# Patient Record
Sex: FEMALE | Race: WHITE | NOT HISPANIC OR LATINO
[De-identification: names, ages, dates, MRNs, and addresses within clinical notes are randomized per-mention and may not be internally consistent; named-entity substitution may affect disease eponyms.]

---

## 2017-01-17 ENCOUNTER — TRANSCRIPTION ENCOUNTER (OUTPATIENT)
Age: 27
End: 2017-01-17

## 2017-04-21 ENCOUNTER — TRANSCRIPTION ENCOUNTER (OUTPATIENT)
Age: 27
End: 2017-04-21

## 2018-10-24 ENCOUNTER — TRANSCRIPTION ENCOUNTER (OUTPATIENT)
Age: 28
End: 2018-10-24

## 2019-05-26 ENCOUNTER — EMERGENCY (EMERGENCY)
Facility: HOSPITAL | Age: 29
LOS: 1 days | Discharge: HOME | End: 2019-05-26
Attending: EMERGENCY MEDICINE | Admitting: EMERGENCY MEDICINE
Payer: COMMERCIAL

## 2019-05-26 VITALS
HEART RATE: 105 BPM | RESPIRATION RATE: 18 BRPM | DIASTOLIC BLOOD PRESSURE: 78 MMHG | SYSTOLIC BLOOD PRESSURE: 117 MMHG | OXYGEN SATURATION: 97 % | TEMPERATURE: 98 F

## 2019-05-26 VITALS
RESPIRATION RATE: 18 BRPM | TEMPERATURE: 98 F | HEIGHT: 59 IN | WEIGHT: 138.01 LBS | SYSTOLIC BLOOD PRESSURE: 128 MMHG | DIASTOLIC BLOOD PRESSURE: 84 MMHG | HEART RATE: 110 BPM | OXYGEN SATURATION: 98 %

## 2019-05-26 DIAGNOSIS — R06.02 SHORTNESS OF BREATH: ICD-10-CM

## 2019-05-26 DIAGNOSIS — M79.605 PAIN IN LEFT LEG: ICD-10-CM

## 2019-05-26 DIAGNOSIS — F17.200 NICOTINE DEPENDENCE, UNSPECIFIED, UNCOMPLICATED: ICD-10-CM

## 2019-05-26 DIAGNOSIS — R11.2 NAUSEA WITH VOMITING, UNSPECIFIED: ICD-10-CM

## 2019-05-26 DIAGNOSIS — Z88.1 ALLERGY STATUS TO OTHER ANTIBIOTIC AGENTS STATUS: ICD-10-CM

## 2019-05-26 DIAGNOSIS — R00.0 TACHYCARDIA, UNSPECIFIED: ICD-10-CM

## 2019-05-26 DIAGNOSIS — M79.604 PAIN IN RIGHT LEG: ICD-10-CM

## 2019-05-26 DIAGNOSIS — R19.7 DIARRHEA, UNSPECIFIED: ICD-10-CM

## 2019-05-26 LAB — VALPROATE SERPL-MCNC: 52.2 UG/ML — SIGNIFICANT CHANGE UP (ref 50–100)

## 2019-05-26 PROCEDURE — 85610 PROTHROMBIN TIME: CPT

## 2019-05-26 PROCEDURE — 36415 COLL VENOUS BLD VENIPUNCTURE: CPT

## 2019-05-26 PROCEDURE — 93010 ELECTROCARDIOGRAM REPORT: CPT | Mod: NC

## 2019-05-26 PROCEDURE — 99284 EMERGENCY DEPT VISIT MOD MDM: CPT | Mod: 25

## 2019-05-26 PROCEDURE — 85379 FIBRIN DEGRADATION QUANT: CPT

## 2019-05-26 PROCEDURE — 81001 URINALYSIS AUTO W/SCOPE: CPT

## 2019-05-26 PROCEDURE — 96375 TX/PRO/DX INJ NEW DRUG ADDON: CPT

## 2019-05-26 PROCEDURE — 71045 X-RAY EXAM CHEST 1 VIEW: CPT | Mod: 26

## 2019-05-26 PROCEDURE — 71045 X-RAY EXAM CHEST 1 VIEW: CPT

## 2019-05-26 PROCEDURE — 82550 ASSAY OF CK (CPK): CPT

## 2019-05-26 PROCEDURE — 85025 COMPLETE CBC W/AUTO DIFF WBC: CPT

## 2019-05-26 PROCEDURE — 84484 ASSAY OF TROPONIN QUANT: CPT

## 2019-05-26 PROCEDURE — 82553 CREATINE MB FRACTION: CPT

## 2019-05-26 PROCEDURE — 80164 ASSAY DIPROPYLACETIC ACD TOT: CPT

## 2019-05-26 PROCEDURE — 96374 THER/PROPH/DIAG INJ IV PUSH: CPT

## 2019-05-26 PROCEDURE — 80053 COMPREHEN METABOLIC PANEL: CPT

## 2019-05-26 PROCEDURE — 96361 HYDRATE IV INFUSION ADD-ON: CPT

## 2019-05-26 PROCEDURE — 83690 ASSAY OF LIPASE: CPT

## 2019-05-26 PROCEDURE — 85730 THROMBOPLASTIN TIME PARTIAL: CPT

## 2019-05-26 PROCEDURE — 87086 URINE CULTURE/COLONY COUNT: CPT

## 2019-05-26 PROCEDURE — 83735 ASSAY OF MAGNESIUM: CPT

## 2019-05-26 PROCEDURE — 93005 ELECTROCARDIOGRAM TRACING: CPT

## 2019-05-26 RX ORDER — CLONAZEPAM 1 MG
1 TABLET ORAL ONCE
Refills: 0 | Status: DISCONTINUED | OUTPATIENT
Start: 2019-05-26 | End: 2019-05-26

## 2019-05-26 RX ORDER — OXYCODONE AND ACETAMINOPHEN 5; 325 MG/1; MG/1
1 TABLET ORAL ONCE
Refills: 0 | Status: DISCONTINUED | OUTPATIENT
Start: 2019-05-26 | End: 2019-05-26

## 2019-05-26 RX ORDER — KETOROLAC TROMETHAMINE 30 MG/ML
15 SYRINGE (ML) INJECTION ONCE
Refills: 0 | Status: DISCONTINUED | OUTPATIENT
Start: 2019-05-26 | End: 2019-05-26

## 2019-05-26 RX ORDER — ONDANSETRON 8 MG/1
4 TABLET, FILM COATED ORAL ONCE
Refills: 0 | Status: COMPLETED | OUTPATIENT
Start: 2019-05-26 | End: 2019-05-26

## 2019-05-26 RX ORDER — SODIUM CHLORIDE 9 MG/ML
1000 INJECTION INTRAMUSCULAR; INTRAVENOUS; SUBCUTANEOUS ONCE
Refills: 0 | Status: COMPLETED | OUTPATIENT
Start: 2019-05-26 | End: 2019-05-26

## 2019-05-26 RX ORDER — ONDANSETRON 8 MG/1
1 TABLET, FILM COATED ORAL
Qty: 12 | Refills: 0
Start: 2019-05-26

## 2019-05-26 RX ADMIN — SODIUM CHLORIDE 1000 MILLILITER(S): 9 INJECTION INTRAMUSCULAR; INTRAVENOUS; SUBCUTANEOUS at 07:45

## 2019-05-26 RX ADMIN — OXYCODONE AND ACETAMINOPHEN 1 TABLET(S): 5; 325 TABLET ORAL at 08:57

## 2019-05-26 RX ADMIN — Medication 15 MILLIGRAM(S): at 07:45

## 2019-05-26 RX ADMIN — Medication 1 MILLIGRAM(S): at 08:57

## 2019-05-26 RX ADMIN — SODIUM CHLORIDE 1000 MILLILITER(S): 9 INJECTION INTRAMUSCULAR; INTRAVENOUS; SUBCUTANEOUS at 08:57

## 2019-05-26 RX ADMIN — ONDANSETRON 4 MILLIGRAM(S): 8 TABLET, FILM COATED ORAL at 07:45

## 2019-05-26 NOTE — ED PROVIDER NOTE - CLINICAL SUMMARY MEDICAL DECISION MAKING FREE TEXT BOX
Impression: acute n/v/d, with associated leg pains. No abd pain/ tenderness. Afebrile, hds. Labs reviewed w/ findings of leukocytosis to 17. Electrolytes/ renal function wnl. D-dimer neg. VPA level within therapeutic range. CXR neg for i/e. EKG showing sinus tach, no arrhythmia/ ischemic changes. Do not suspect thromboembolism. Pt with likely viral gastroenteritis w/ associated myalgias. Pt feeling better with tx in ed. ED evaluation and management discussed with the patient and family in detail.  Close PMD follow up encouraged.  Strict ED return instructions discussed in detail and patient given the opportunity to ask any questions about their discharge diagnosis and instructions. Patient verbalized understanding.

## 2019-05-26 NOTE — ED ADULT NURSE NOTE - OBJECTIVE STATEMENT
Patient presents to the ED with c/o CP, nausea, vomiting, diarrhea, and BL leg pain beginning yesterday. States she was seen at [facility] MD for the same symptoms yesterday and was advised if symptoms persisted, to seek care at an ER. Patient speaking in complete sentences without difficulty. Denies acute cough, fever or abdominal pain. NAD noted work up in process, MD at bedside.

## 2019-05-26 NOTE — ED PROVIDER NOTE - PHYSICAL EXAMINATION
VITAL SIGNS: I have reviewed nursing notes and confirm.  CONSTITUTIONAL: Well-developed; well-nourished; in no acute distress.   SKIN:  warm and dry, no acute rash.   HEAD:  normocephalic, atraumatic.  EYES: EOM intact; conjunctiva and sclera clear.  ENT: No nasal discharge; airway clear.   NECK: Supple.  CARD: S1, S2 normal; no murmurs, gallops, or rubs. Tachycardic rate and reg rhythm.   RESP:  Clear to auscultation b/l, no wheezes, rales or rhonchi.  ABD: Normal bowel sounds; soft; non-distended; non-tender; no guarding/ rebound.  EXT: Normal ROM. No clubbing, cyanosis or edema. No calf tenderness/ cords. 2+ pulses to b/l ue/le.  NEURO: Alert, oriented, grossly unremarkable  PSYCH: Cooperative, mood and affect appropriate.

## 2019-05-26 NOTE — ED PROVIDER NOTE - NSFOLLOWUPINSTRUCTIONS_ED_ALL_ED_FT
Take zofran as needed for nausea/ vomiting. Drink plenty of fluids. Stick to BRAT diet (bananas, rice, applesauce toast). Follow up with your primary care physician for re-evaluation.     EnglishPortugueseRussianSpanish    Viral Gastroenteritis, Adult  Image   Viral gastroenteritis is also known as the stomach flu. This condition is caused by certain germs (viruses). These germs can be passed from person to person very easily (are very contagious). This condition can cause sudden watery poop (diarrhea), fever, and throwing up (vomiting).    Having watery poop and throwing up can make you feel weak and cause you to get dehydrated. Dehydration can make you tired and thirsty, make you have a dry mouth, and make it so you pee (urinate) less often. Older adults and people with other diseases or a weak defense system (immune system) are at higher risk for dehydration. It is important to replace the fluids that you lose from having watery poop and throwing up.    Follow these instructions at home:  Follow instructions from your doctor about how to care for yourself at home.    Eating and drinking     Follow these instructions as told by your doctor:  Take an oral rehydration solution (ORS). This is a drink that is sold at pharmacies and stores.  Drink clear fluids in small amounts as you are able, such as:  Water.  Ice chips.  Diluted fruit juice.  Low-calorie sports drinks.  Eat bland, easy-to-digest foods in small amounts as you are able, such as:  Bananas.  Applesauce.  Rice.  Low-fat (lean) meats.  Toast.  Crackers.  Avoid fluids that have a lot of sugar or caffeine in them.  Avoid alcohol.  Avoid spicy or fatty foods.  General instructions    ImageDrink enough fluid to keep your pee (urine) clear or pale yellow.  Wash your hands often. If you cannot use soap and water, use hand .  Make sure that all people in your home wash their hands well and often.  Rest at home while you get better.  Take over-the-counter and prescription medicines only as told by your doctor.  Watch your condition for any changes.  Take a warm bath to help with any burning or pain from having watery poop.  Keep all follow-up visits as told by your doctor. This is important.  Contact a doctor if:  You cannot keep fluids down.  Your symptoms get worse.  You have new symptoms.  You feel light-headed or dizzy.  You have muscle cramps.  Get help right away if:  You have chest pain.  You feel very weak or you pass out (faint).  You see blood in your throw-up.  Your throw-up looks like coffee grounds.  You have bloody or black poop (stools) or poop that look like tar.  You have a very bad headache, a stiff neck, or both.  You have a rash.  You have very bad pain, cramping, or bloating in your belly (abdomen).  You have trouble breathing.  You are breathing very quickly.  Your heart is beating very quickly.  Your skin feels cold and clammy.  You feel confused.  You have pain when you pee.  You have signs of dehydration, such as:  Dark pee, hardly any pee, or no pee.  Cracked lips.  Dry mouth.  Sunken eyes.  Sleepiness.  Weakness.  This information is not intended to replace advice given to you by your health care provider. Make sure you discuss any questions you have with your health care provider.    Document Released: 06/05/2009 Document Revised: 02/16/2018 Document Reviewed: 08/23/2016  [employer] Interactive Patient Education © 2019 [employer]

## 2019-05-26 NOTE — ED ADULT TRIAGE NOTE - CHIEF COMPLAINT QUOTE
shortness of breath / weakness /chest pain on/ off few days.Pt was seen at  [facility] yesterday for same problem,. pt woke up this morning with  vomiting and diarrhea.

## 2019-05-26 NOTE — ED ADULT NURSE REASSESSMENT NOTE - EENT WDL
Eyes with no visual disturbances. . Nose with pink mucosa and no drainage.  Mouth mucous membranes moist and pink.  No tenderness or swelling to throat or neck.

## 2019-05-26 NOTE — ED ADULT NURSE REASSESSMENT NOTE - NS ED NURSE REASSESS COMMENT FT1
Pt alert and oriented X3. valproic acid drawn and sent to lab. Pt still complaining of lower extremities pain in spite Toradol administration. Pt also complaining of "mind racinf" and feeling very "anxious. MD Caballero made aware. Pt was able to eat, tolerated well.

## 2019-05-26 NOTE — ED PROVIDER NOTE - OBJECTIVE STATEMENT
Pt is a 27yo f, h/o sinus tachycardia (baseline hr > 100bpm) thought to be 2/2 anxiety and bpd, currently on depakote, abilify, and klonopin, who p/w c/o n/v/d starting this am, nbnb emesis. + soft stools x 2, no melena/ blood. No abd pain, fever, chills, cough, congestion. No dysuria, freq, urgency, hematuria, flank pain. Also with c/o mild sob, and burning to b/l legs since this am, worse w/ walking. No chest pain, palpitations, syncope/ lightheadedness. Denies any recent trauma/ increased walking/ new exercises. No leg edema, prolonged immobility.

## 2019-05-26 NOTE — ED ADULT NURSE NOTE - NSIMPLEMENTINTERV_GEN_ALL_ED
Implemented All Universal Safety Interventions:  [place] to call system. Call bell, personal items and telephone within reach. Instruct patient to call for assistance. Room bathroom lighting operational. Non-slip footwear when patient is off stretcher. Physically safe environment: no spills, clutter or unnecessary equipment. Stretcher in lowest position, wheels locked, appropriate side rails in place.

## 2019-05-26 NOTE — ED PROVIDER NOTE - PROGRESS NOTE DETAILS
UA + for mod le and 5-10 wbcs. Pt is asymptomatic with no dysuria, freq, urgency of urination. Will send ucx. Pt advised that if ucx + for infection, will prescribe abx.

## 2019-12-17 ENCOUNTER — EMERGENCY (EMERGENCY)
Facility: HOSPITAL | Age: 29
LOS: 1 days | Discharge: HOME | End: 2019-12-17
Attending: EMERGENCY MEDICINE | Admitting: EMERGENCY MEDICINE
Payer: COMMERCIAL

## 2019-12-17 VITALS
WEIGHT: 160.06 LBS | DIASTOLIC BLOOD PRESSURE: 82 MMHG | HEIGHT: 59 IN | SYSTOLIC BLOOD PRESSURE: 137 MMHG | TEMPERATURE: 98 F | RESPIRATION RATE: 16 BRPM | OXYGEN SATURATION: 97 % | HEART RATE: 74 BPM

## 2019-12-17 VITALS
SYSTOLIC BLOOD PRESSURE: 114 MMHG | DIASTOLIC BLOOD PRESSURE: 67 MMHG | TEMPERATURE: 98 F | HEART RATE: 65 BPM | RESPIRATION RATE: 16 BRPM | OXYGEN SATURATION: 99 %

## 2019-12-17 LAB
ALBUMIN SERPL ELPH-MCNC: 4 G/DL — SIGNIFICANT CHANGE UP (ref 3.3–5)
ALP SERPL-CCNC: 113 U/L — SIGNIFICANT CHANGE UP (ref 40–120)
ALT FLD-CCNC: 33 U/L — SIGNIFICANT CHANGE UP (ref 10–45)
ANION GAP SERPL CALC-SCNC: 11 MMOL/L — SIGNIFICANT CHANGE UP (ref 5–17)
APPEARANCE UR: CLEAR — SIGNIFICANT CHANGE UP
AST SERPL-CCNC: 17 U/L — SIGNIFICANT CHANGE UP (ref 10–40)
BACTERIA # UR AUTO: PRESENT /HPF
BASOPHILS # BLD AUTO: 0.04 K/UL — SIGNIFICANT CHANGE UP (ref 0–0.2)
BASOPHILS NFR BLD AUTO: 0.5 % — SIGNIFICANT CHANGE UP (ref 0–2)
BILIRUB SERPL-MCNC: 0.2 MG/DL — SIGNIFICANT CHANGE UP (ref 0.2–1.2)
BILIRUB UR-MCNC: NEGATIVE — SIGNIFICANT CHANGE UP
BUN SERPL-MCNC: 9 MG/DL — SIGNIFICANT CHANGE UP (ref 7–23)
CALCIUM SERPL-MCNC: 8.8 MG/DL — SIGNIFICANT CHANGE UP (ref 8.4–10.5)
CHLORIDE SERPL-SCNC: 105 MMOL/L — SIGNIFICANT CHANGE UP (ref 96–108)
CO2 SERPL-SCNC: 23 MMOL/L — SIGNIFICANT CHANGE UP (ref 22–31)
COLOR SPEC: YELLOW — SIGNIFICANT CHANGE UP
COMMENT - URINE: SIGNIFICANT CHANGE UP
CREAT SERPL-MCNC: 0.63 MG/DL — SIGNIFICANT CHANGE UP (ref 0.5–1.3)
DIFF PNL FLD: ABNORMAL
EOSINOPHIL # BLD AUTO: 0.13 K/UL — SIGNIFICANT CHANGE UP (ref 0–0.5)
EOSINOPHIL NFR BLD AUTO: 1.7 % — SIGNIFICANT CHANGE UP (ref 0–6)
EPI CELLS # UR: ABNORMAL /HPF (ref 0–5)
GLUCOSE SERPL-MCNC: 250 MG/DL — HIGH (ref 70–99)
GLUCOSE UR QL: >=1000
HCG UR QL: NEGATIVE — SIGNIFICANT CHANGE UP
HCT VFR BLD CALC: 39.6 % — SIGNIFICANT CHANGE UP (ref 34.5–45)
HGB BLD-MCNC: 13.2 G/DL — SIGNIFICANT CHANGE UP (ref 11.5–15.5)
IMM GRANULOCYTES NFR BLD AUTO: 0.8 % — SIGNIFICANT CHANGE UP (ref 0–1.5)
KETONES UR-MCNC: ABNORMAL MG/DL
LEUKOCYTE ESTERASE UR-ACNC: NEGATIVE — SIGNIFICANT CHANGE UP
LIDOCAIN IGE QN: 39 U/L — SIGNIFICANT CHANGE UP (ref 7–60)
LYMPHOCYTES # BLD AUTO: 2.66 K/UL — SIGNIFICANT CHANGE UP (ref 1–3.3)
LYMPHOCYTES # BLD AUTO: 34.2 % — SIGNIFICANT CHANGE UP (ref 13–44)
MCHC RBC-ENTMCNC: 31.3 PG — SIGNIFICANT CHANGE UP (ref 27–34)
MCHC RBC-ENTMCNC: 33.3 GM/DL — SIGNIFICANT CHANGE UP (ref 32–36)
MCV RBC AUTO: 93.8 FL — SIGNIFICANT CHANGE UP (ref 80–100)
MONOCYTES # BLD AUTO: 0.49 K/UL — SIGNIFICANT CHANGE UP (ref 0–0.9)
MONOCYTES NFR BLD AUTO: 6.3 % — SIGNIFICANT CHANGE UP (ref 2–14)
NEUTROPHILS # BLD AUTO: 4.4 K/UL — SIGNIFICANT CHANGE UP (ref 1.8–7.4)
NEUTROPHILS NFR BLD AUTO: 56.5 % — SIGNIFICANT CHANGE UP (ref 43–77)
NITRITE UR-MCNC: NEGATIVE — SIGNIFICANT CHANGE UP
NRBC # BLD: 0 /100 WBCS — SIGNIFICANT CHANGE UP (ref 0–0)
PH UR: 7 — SIGNIFICANT CHANGE UP (ref 5–8)
PLATELET # BLD AUTO: 286 K/UL — SIGNIFICANT CHANGE UP (ref 150–400)
POTASSIUM SERPL-MCNC: 4 MMOL/L — SIGNIFICANT CHANGE UP (ref 3.5–5.3)
POTASSIUM SERPL-SCNC: 4 MMOL/L — SIGNIFICANT CHANGE UP (ref 3.5–5.3)
PROT SERPL-MCNC: 6.9 G/DL — SIGNIFICANT CHANGE UP (ref 6–8.3)
PROT UR-MCNC: NEGATIVE MG/DL — SIGNIFICANT CHANGE UP
RBC # BLD: 4.22 M/UL — SIGNIFICANT CHANGE UP (ref 3.8–5.2)
RBC # FLD: 11.3 % — SIGNIFICANT CHANGE UP (ref 10.3–14.5)
RBC CASTS # UR COMP ASSIST: < 5 /HPF — SIGNIFICANT CHANGE UP
SODIUM SERPL-SCNC: 139 MMOL/L — SIGNIFICANT CHANGE UP (ref 135–145)
SP GR SPEC: 1.01 — SIGNIFICANT CHANGE UP (ref 1–1.03)
UROBILINOGEN FLD QL: 0.2 E.U./DL — SIGNIFICANT CHANGE UP
WBC # BLD: 7.78 K/UL — SIGNIFICANT CHANGE UP (ref 3.8–10.5)
WBC # FLD AUTO: 7.78 K/UL — SIGNIFICANT CHANGE UP (ref 3.8–10.5)
WBC UR QL: < 5 /HPF — SIGNIFICANT CHANGE UP

## 2019-12-17 PROCEDURE — 80053 COMPREHEN METABOLIC PANEL: CPT

## 2019-12-17 PROCEDURE — 96374 THER/PROPH/DIAG INJ IV PUSH: CPT

## 2019-12-17 PROCEDURE — 96375 TX/PRO/DX INJ NEW DRUG ADDON: CPT

## 2019-12-17 PROCEDURE — 81025 URINE PREGNANCY TEST: CPT

## 2019-12-17 PROCEDURE — 99284 EMERGENCY DEPT VISIT MOD MDM: CPT | Mod: 25

## 2019-12-17 PROCEDURE — 36415 COLL VENOUS BLD VENIPUNCTURE: CPT

## 2019-12-17 PROCEDURE — 70450 CT HEAD/BRAIN W/O DYE: CPT

## 2019-12-17 PROCEDURE — 70450 CT HEAD/BRAIN W/O DYE: CPT | Mod: 26

## 2019-12-17 PROCEDURE — 85025 COMPLETE CBC W/AUTO DIFF WBC: CPT

## 2019-12-17 PROCEDURE — 83690 ASSAY OF LIPASE: CPT

## 2019-12-17 PROCEDURE — 99284 EMERGENCY DEPT VISIT MOD MDM: CPT

## 2019-12-17 PROCEDURE — 81001 URINALYSIS AUTO W/SCOPE: CPT

## 2019-12-17 RX ORDER — FAMOTIDINE 10 MG/ML
20 INJECTION INTRAVENOUS ONCE
Refills: 0 | Status: COMPLETED | OUTPATIENT
Start: 2019-12-17 | End: 2019-12-17

## 2019-12-17 RX ORDER — DIVALPROEX SODIUM 500 MG/1
0 TABLET, DELAYED RELEASE ORAL
Qty: 0 | Refills: 0 | DISCHARGE

## 2019-12-17 RX ORDER — ONDANSETRON 8 MG/1
4 TABLET, FILM COATED ORAL ONCE
Refills: 0 | Status: COMPLETED | OUTPATIENT
Start: 2019-12-17 | End: 2019-12-17

## 2019-12-17 RX ORDER — CLONAZEPAM 1 MG
0 TABLET ORAL
Qty: 0 | Refills: 0 | DISCHARGE

## 2019-12-17 RX ORDER — SODIUM CHLORIDE 9 MG/ML
1000 INJECTION INTRAMUSCULAR; INTRAVENOUS; SUBCUTANEOUS ONCE
Refills: 0 | Status: COMPLETED | OUTPATIENT
Start: 2019-12-17 | End: 2019-12-17

## 2019-12-17 RX ORDER — ONDANSETRON 8 MG/1
1 TABLET, FILM COATED ORAL
Qty: 10 | Refills: 0
Start: 2019-12-17

## 2019-12-17 RX ORDER — ARIPIPRAZOLE 15 MG/1
0 TABLET ORAL
Qty: 0 | Refills: 0 | DISCHARGE

## 2019-12-17 RX ADMIN — SODIUM CHLORIDE 1000 MILLILITER(S): 9 INJECTION INTRAMUSCULAR; INTRAVENOUS; SUBCUTANEOUS at 08:54

## 2019-12-17 RX ADMIN — ONDANSETRON 4 MILLIGRAM(S): 8 TABLET, FILM COATED ORAL at 08:56

## 2019-12-17 RX ADMIN — FAMOTIDINE 20 MILLIGRAM(S): 10 INJECTION INTRAVENOUS at 09:25

## 2019-12-17 NOTE — ED ADULT NURSE NOTE - OBJECTIVE STATEMENT
30 yo F presents to ED c/o vomiting and hiccups. Pt states, "the past week anabelle bene feeling unsteady and I have the hiccups sometimes and the past 2 days I started vomiting and have heartburn and feel bloated." Pt reports going to urgent care last night but came to ED as directed for persistent vomiting, reports 2 episodes of emesis last night. Able to keep down water, pasta, and bread. Upon assessment pt abdomen is distended and RUQ/ LUQ tender to palpation, abdomen hard, bowel sounds present, able to pass gas, last BM this morning. Pt denies any SOB, chest pain, diarrhea. blood in stool, hematuria, burning upon urination, blood in emesis, fever, chills. NAD, VSS, labs collected, will continue to assess.

## 2019-12-17 NOTE — ED PROVIDER NOTE - PATIENT PORTAL LINK FT
You can access the FollowMyHealth Patient Portal offered by [facility] by registering at the following website: [email or web address]. By joining [employer]’s FollowMyHealth portal, you will also be able to view your health information using other applications (apps) compatible with our system.

## 2019-12-17 NOTE — ED PROVIDER NOTE - ATTENDING CONTRIBUTION TO CARE
30 yo f presents to ED with concern for feeling off balance x 1 week.  Denies lightheadedness or dizziness.  Notes associated nausea and emesis that began two days ago.  At rest she feels well, and is only experiencing symptoms with ambulation.  On my face to face ED eval, patient is non toxic in appearance.  HRRR, lungs clear.  Abd soft, NT/ND.  No focal neuro deficits.  Will check labs, CT head and dispo accordingly.

## 2019-12-17 NOTE — ED PROVIDER NOTE - NSFOLLOWUPINSTRUCTIONS_ED_ALL_ED_FT
You were found to have elevated blood sugar and blood in your urine.  There is a change you are pre diabetic or diabetic.  Please call to make appointment with primary doctor within 3-5 days and drink plenty of water/diet changes as discussed.  Return to ED for fever, dizziness, fainting, abdominal pain, vomiting, difficulty breathing, change to mental status.    Preventing Type 2 Diabetes Mellitus  Type 2 diabetes (type 2 diabetes mellitus) is a long-term (chronic) disease that affects blood sugar (glucose) levels. Normally, a hormone called insulin allows glucose to enter cells in the body. The cells use glucose for energy. In type 2 diabetes, one or both of these problems may be present:  The body does not make enough insulin.The body does not respond properly to insulin that it makes (insulin resistance).Insulin resistance or lack of insulin causes excess glucose to build up in the blood instead of going into cells. As a result, high blood glucose (hyperglycemia) develops, which can cause many complications. Being overweight or obese and having an inactive (sedentary) lifestyle can increase your risk for diabetes. Type 2 diabetes can be delayed or prevented by making certain nutrition and lifestyle changes.  What nutrition changes can be made?     Eat healthy meals and snacks regularly. Keep a healthy snack with you for when you get hungry between meals, such as fruit or a handful of nuts.Eat lean meats and proteins that are low in saturated fats, such as chicken, fish, egg whites, and beans. Avoid processed meats.Eat plenty of fruits and vegetables and plenty of grains that have not been processed (whole grains). It is recommended that you eat:  1½?2 cups of fruit every day.2½?3 cups of vegetables every day.6?8 oz of whole grains every day, such as oats, whole wheat, bulgur, brown rice, quinoa, and millet.Eat low-fat dairy products, such as milk, yogurt, and cheese.Eat foods that contain healthy fats, such as nuts, avocado, olive oil, and canola oil.Drink water throughout the day. Avoid drinks that contain added sugar, such as soda or sweet tea.Follow instructions from your health care provider about specific eating or drinking restrictions.Control how much food you eat at a time (portion size).  Check food labels to find out the serving sizes of foods.Use a kitchen scale to weigh amounts of foods.Saute or steam food instead of frying it. Cook with water or broth instead of oils or butter.Limit your intake of:  Salt (sodium). Have no more than 1 tsp (2,400 mg) of sodium a day. If you have heart disease or high blood pressure, have less than ½?¾ tsp (1,500 mg) of sodium a day.Saturated fat. This is fat that is solid at room temperature, such as butter or fat on meat.What lifestyle changes can be made?  Activity        Do moderate-intensity physical activity for at least 30 minutes on at least 5 days of the week, or as much as told by your health care provider.Ask your health care provider what activities are safe for you. A mix of physical activities may be best, such as walking, swimming, cycling, and strength training.Try to add physical activity into your day. For example:  Park in spots that are farther away than usual, so that you walk more. For example, park in a far corner of the parking lot when you go to the office or the grocery store.Take a walk during your lunch break.Use stairs instead of elevators or escalators.Weight Loss     Lose weight as directed. Your health care provider can determine how much weight loss is best for you and can help you lose weight safely.If you are overweight or obese, you may be instructed to lose at least 5?7 % of your body weight.Alcohol and Tobacco        Limit alcohol intake to no more than 1 drink a day for nonpregnant women and 2 drinks a day for men. One drink equals 12 oz of beer, 5 oz of wine, or 1½ oz of hard liquor.Do not use any tobacco products, such as cigarettes, chewing tobacco, and e-cigarettes. If you need help quitting, ask your health care provider.Work With Your Health Care Provider     Have your blood glucose tested regularly, as told by your health care provider.Discuss your risk factors and how you can reduce your risk for diabetes.Get screening tests as told by your health care provider. You may have screening tests regularly, especially if you have certain risk factors for type 2 diabetes.Make an appointment with a diet and nutrition specialist (registered dietitian). A registered dietitian can help you make a healthy eating plan and can help you understand portion sizes and food labels.Why are these changes important?  It is possible to prevent or delay type 2 diabetes and related health problems by making lifestyle and nutrition changes.It can be difficult to recognize signs of type 2 diabetes. The best way to avoid possible damage to your body is to take actions to prevent the disease before you develop symptoms.What can happen if changes are not made?  Your blood glucose levels may keep increasing. Having high blood glucose for a long time is dangerous. Too much glucose in your blood can damage your blood vessels, heart, kidneys, nerves, and eyes.You may develop prediabetes or type 2 diabetes. Type 2 diabetes can lead to many chronic health problems and complications, such as:  Heart disease.Stroke.Blindness.Kidney disease.Depression.Poor circulation in the feet and legs, which could lead to surgical removal (amputation) in severe cases.Where to find support  Ask your health care provider to recommend a registered dietitian, diabetes educator, or weight loss program.Look for local or online weight loss groups.Join a gym, fitness club, or outdoor activity group, such as a walking club.Where to find more information  To learn more about diabetes and diabetes prevention, visit:  American Diabetes Association (ADA): www.diabetes.orgNational [place] of Diabetes and Digestive and Kidney Diseases: www.niddk.nih.gov/health-information/diabetesTo learn more about healthy eating, visit:  The U.S. Department of Agriculture (USDA), Choose My Plate: www.Syntasiamyplate.gov/food-groupsOffice of Disease Prevention and Health Promotion (ODPHP), Dietary Guidelines: www.health.gov/dietaryguidelinesSummary  You can reduce your risk for type 2 diabetes by increasing your physical activity, eating healthy foods, and losing weight as directed.Talk with your health care provider about your risk for type 2 diabetes. Ask about any blood tests or screening tests that you need to have.This information is not intended to replace advice given to you by your health care provider. Make sure you discuss any questions you have with your health care provider.

## 2019-12-17 NOTE — ED ADULT NURSE NOTE - NSIMPLEMENTINTERV_GEN_ALL_ED
Implemented All Universal Safety Interventions:  [place] to call system. Call bell, personal items and telephone within reach. Instruct patient to call for assistance. Room bathroom lighting operational. Non-slip footwear when patient is off stretcher. Physically safe environment: no spills, clutter or unnecessary equipment. Stretcher in lowest position, wheels locked, appropriate side rails in place.

## 2019-12-17 NOTE — ED PROVIDER NOTE - OBJECTIVE STATEMENT
30 y/o F with PMHx of Psychiatric problem p/w 1 week of feeling off balance and 2d vomiting and abd pain. States a week ago she was unable to walk in a straight line w/ occasional ringing in the ears. Denies numbness, weakness, vision changes, or head trauma. 2d ago pt started having nausea and vomiting. Today, pt has epigastric burning and feeling of acid coming up. Denies similar sxs in the past. Does not endorse fever, chills, HA, or neck pain.

## 2019-12-17 NOTE — ED PROVIDER NOTE - CHPI ED SYMPTOMS NEG
. Does not endorse fever, chills, HA,  neck pain, numbness, weakness, vision changes, or head trauma.

## 2019-12-17 NOTE — ED ADULT TRIAGE NOTE - CHIEF COMPLAINT QUOTE
pt c/o vomiting and abdominal pain for 2 days, hiccups and feeling unsteady for one week. denies significant PMHx. pt ambulating with steady gait.

## 2019-12-17 NOTE — ED PROVIDER NOTE - CLINICAL SUMMARY MEDICAL DECISION MAKING FREE TEXT BOX
28 y/o F with PMHx of Psychiatric problem p/w 1 week of feeling off balance and 2d vomiting and abd pain.  normal neuro exam/abd exam.  labs w/ glucose 250, no AG, and urine > 1000 glucose; no infection.  CT brain unremarkable.  symptoms likely 2/2 hyperglycemia, informed of results and need for close PMD follow up/diet changes for glucose control.  she will call to make appointment today.  pt feeling better after GI cocktail.  discussed strict return parameters

## 2019-12-17 NOTE — ED PROVIDER NOTE - PHYSICAL EXAMINATION
Vitals reviewed  Gen: well appearing, nad, speaking in full sentences  Skin: wwp   HEENT: ncat, eomi, mmm  CV: rrr, no audible m/r/g  Resp: ctab, no w/r/r  Abd: soft/nt  Ext: FROM throughout, no peripheral edema  Neuro: alert/oriented, no focal deficits, steady gait. nml finger to nose   eye: MEG. steady gait w/ assistance Vitals reviewed  Gen: well appearing, nad, speaking in full sentences  Skin: wwp   HEENT: ncat, eomi, perrla, no nystagmus, mmm  CV: rrr, no audible m/r/g  Resp: ctab, no w/r/r  Abd: soft/nt  Ext: FROM throughout, no peripheral edema  Neuro: alert/oriented, no focal deficits, steady gait. nml finger to nose

## 2019-12-19 PROBLEM — F99 MENTAL DISORDER, NOT OTHERWISE SPECIFIED: Chronic | Status: ACTIVE | Noted: 2019-12-17

## 2019-12-20 DIAGNOSIS — R11.2 NAUSEA WITH VOMITING, UNSPECIFIED: ICD-10-CM

## 2019-12-20 DIAGNOSIS — R26.2 DIFFICULTY IN WALKING, NOT ELSEWHERE CLASSIFIED: ICD-10-CM

## 2019-12-20 DIAGNOSIS — R10.13 EPIGASTRIC PAIN: ICD-10-CM

## 2019-12-20 DIAGNOSIS — R73.9 HYPERGLYCEMIA, UNSPECIFIED: ICD-10-CM

## 2019-12-23 ENCOUNTER — APPOINTMENT (OUTPATIENT)
Dept: ENDOCRINOLOGY | Facility: CLINIC | Age: 29
End: 2019-12-23
Payer: COMMERCIAL

## 2019-12-23 ENCOUNTER — TRANSCRIPTION ENCOUNTER (OUTPATIENT)
Age: 29
End: 2019-12-23

## 2019-12-23 VITALS
DIASTOLIC BLOOD PRESSURE: 86 MMHG | BODY MASS INDEX: 34.07 KG/M2 | SYSTOLIC BLOOD PRESSURE: 139 MMHG | HEIGHT: 59 IN | HEART RATE: 126 BPM | WEIGHT: 169 LBS

## 2019-12-23 DIAGNOSIS — Z80.3 FAMILY HISTORY OF MALIGNANT NEOPLASM OF BREAST: ICD-10-CM

## 2019-12-23 DIAGNOSIS — F17.200 NICOTINE DEPENDENCE, UNSPECIFIED, UNCOMPLICATED: ICD-10-CM

## 2019-12-23 DIAGNOSIS — F31.9 BIPOLAR DISORDER, UNSPECIFIED: ICD-10-CM

## 2019-12-23 LAB
GLUCOSE BLDC GLUCOMTR-MCNC: 198
HBA1C MFR BLD HPLC: 6.4

## 2019-12-23 PROCEDURE — 99205 OFFICE O/P NEW HI 60 MIN: CPT | Mod: 25

## 2019-12-23 PROCEDURE — 82962 GLUCOSE BLOOD TEST: CPT | Mod: NC

## 2019-12-23 PROCEDURE — 83036 HEMOGLOBIN GLYCOSYLATED A1C: CPT | Mod: QW

## 2019-12-23 RX ORDER — TRAZODONE HYDROCHLORIDE 300 MG/1
TABLET ORAL
Refills: 0 | Status: ACTIVE | COMMUNITY

## 2019-12-23 RX ORDER — CLONAZEPAM 2 MG/1
TABLET ORAL
Refills: 0 | Status: ACTIVE | COMMUNITY

## 2019-12-23 NOTE — HISTORY OF PRESENT ILLNESS
[FreeTextEntry1] : Ms. RAGHAV YUN is 29 year old female here today for hospitalization Follow-up \par Pt recently seen in ER for c/o nausea and vomiting upon checking her BG was 250 with  glucose in urine >1000 and trace ketone.\par Pt with polyuria and polydipsia \par New Onset DM \par PMH: bipolar \par \par Last Optho visit: >1 year \par Last Podiatrist visit: never \par Diet:\par Breakfast: bagel, omelets, \par Lunch: sandwiches \par Dinner: Burgers/ pizza \par \par Exercise: sedentary \par Current Medications: Depakote, trazodone, Klonopin, metformin 500 mg QD, faxiga 5 mg QD \par SMBG: 3-4 times a day \par \par \par ROS: +polyuria, polydipsia \par tachycardia - pt tells me that she has had elevated HR since March.  She has been seen in ER with 's.  She feels palpitations but no SOB or CP.  She has never been referred to cardiology for work-up. Unknown cause of tachycardiac\par \par \par \par

## 2019-12-23 NOTE — PHYSICAL EXAM
[No Respiratory Distress] : no respiratory distress [Alert] : alert [Clear to Auscultation] : lungs were clear to auscultation bilaterally [Normal S1, S2] : normal S1 and S2 [Normal Rate and Effort] : normal respiratory rhythm and effort [No Edema] : there was no peripheral edema [Left Foot Was Examined] : left foot ~C was examined [Normal Gait] : normal gait [Right Foot Was Examined] : right foot ~C was examined [Normal] : normal [2+] : 2+ in the dorsalis pedis [Normal Sensation on Monofilament Testing] : normal sensation on monofilament testing of lower extremities [Oriented x3] : oriented to person, place, and time [Position Sense Dec.] : normal position sense at the level of the toes [de-identified] : tachycardia at 126

## 2019-12-23 NOTE — REVIEW OF SYSTEMS
[Palpitations] : palpitations [Heart Rate Is Fast] : fast heart rate [Polyuria] : polyuria [Negative] : Neurological [Shortness Of Breath] : no shortness of breath [SOB on Exertion] : no shortness of breath during exertion [de-identified] : h/o bipolar

## 2019-12-23 NOTE — ASSESSMENT
[Hypoglycemia Management] : hypoglycemia management [Carbohydrate Consistent Diet] : carbohydrate consistent diet [Sick-Day Management] : sick-day management [Diabetes Foot Care] : diabetes foot care [Long Term Vascular Complications] : long term vascular complications of diabetes [Self Monitoring of Blood Glucose] : self monitoring of blood glucose [Importance of Diet and Exercise] : importance of diet and exercise to improve glycemic control, achieve weight loss and improve cardiovascular health [FreeTextEntry1] : 30 yo female with new onset T2DM subsequently found in ER due to c/i nausea and vomiting \par Pt doing better now with glucose better controlled \par A1C 6.4% \par Extensive education provided at this visit with written material and diabetic treatment plan.\par Reviewed Diabetic goals including the importance of diet and exercise, hypoglycemia protocol, and target BG.\par Reviewed plate method\par Pt with poor eating habits. Discussed importance of making dietary changes and starting to exercise.\par Referred to RD, optho and podiatrist \par Pt with tachycardiac of unknown cause since March referred to cardiology for evaluation \par \par DM: \par Check BG TID and 1 PP keep log for next visit \par Cont with metformin and farxiga \par F/ U RD \par \par Tachycardia:\par Referred cardiology \par \par RTC in 2 week

## 2020-01-06 ENCOUNTER — APPOINTMENT (OUTPATIENT)
Dept: ENDOCRINOLOGY | Facility: CLINIC | Age: 30
End: 2020-01-06
Payer: COMMERCIAL

## 2020-01-06 VITALS
DIASTOLIC BLOOD PRESSURE: 84 MMHG | WEIGHT: 165 LBS | SYSTOLIC BLOOD PRESSURE: 123 MMHG | BODY MASS INDEX: 33.33 KG/M2 | HEART RATE: 118 BPM

## 2020-01-06 DIAGNOSIS — Z83.3 FAMILY HISTORY OF DIABETES MELLITUS: ICD-10-CM

## 2020-01-06 LAB — GLUCOSE BLDC GLUCOMTR-MCNC: 93

## 2020-01-06 PROCEDURE — 99214 OFFICE O/P EST MOD 30 MIN: CPT | Mod: 25

## 2020-01-06 PROCEDURE — 97802 MEDICAL NUTRITION INDIV IN: CPT

## 2020-01-06 PROCEDURE — 82962 GLUCOSE BLOOD TEST: CPT | Mod: NC

## 2020-01-06 RX ORDER — BLOOD SUGAR DIAGNOSTIC
STRIP MISCELLANEOUS
Qty: 100 | Refills: 4 | Status: ACTIVE | COMMUNITY
Start: 2019-12-23 | End: 1900-01-01

## 2020-01-06 NOTE — HISTORY OF PRESENT ILLNESS
[FreeTextEntry1] : Ms. RAGHAV YUN is 29 year old female here today for T2 DM follow-up \par PMH: Bipolar \par Today is doing well since last visit/  has made adjustment to diet, but has not been able top start exercise due to pain in feet and elevated HR.  Pt has apple watch which informs her of her HR and resting HR is 120 on average with HR reaching 160 when walking.  She has made an appt. to see cardiologist this week.  She is concerned about the pain in her feet. She tells me its a burning sensation and occasional get swelling to her feet. \par Brought in log and number are close to goal both fasting and post prandial.  She does has some higher sugars in AM.  She wpuld like to keep trying to work on diet and exercsie to reach optimal glucose levels. \par \par \par \par \par 12/23/2019\par Pt recently seen in ER for c/o nausea and vomiting upon checking her BG was 250 with  glucose in urine >1000 and trace ketone.\par Pt with polyuria and polydipsia \par New Onset DM \par PMH: bipolar \par \par Last Optho visit: >1 year \par Last Podiatrist visit: never \par Diet:\par Breakfast: bagel, omelets, \par Lunch: sandwiches \par Dinner: Burgers/ pizza \par \par Exercise: sedentary \par Current Medications: Depakote, trazodone, Klonopin, metformin 500 mg QD, faxiga 5 mg QD \par SMBG: 3-4 times a day \par \par \par ROS: +polyuria, polydipsia \par tachycardia - pt tells me that she has had elevated HR since March.  She has been seen in ER with 's.  She feels palpitations but no SOB or CP.  She has never been referred to cardiology for work-up. Unknown cause of tachycardiac

## 2020-01-06 NOTE — PHYSICAL EXAM
[Alert] : alert [No Accessory Muscle Use] : no accessory muscle use [Clear to Auscultation] : lungs were clear to auscultation bilaterally [Normal S1, S2] : normal S1 and S2 [Pedal Pulses Normal] : the pedal pulses are present [No Edema] : there was no peripheral edema [Oriented x3] : oriented to person, place, and time [de-identified] : Tachycardiac 118 today  [de-identified] : burning sensation to feet

## 2020-01-06 NOTE — ASSESSMENT
[FreeTextEntry1] : 28 yo female with new onset T2DM subsequently found in ER\par progressing well and working on diet and exercise. \par Likely has peripheral neuropathy due to pain and burning sensation to bilateral feet- referred to podiatry \par Edema high likely related to standing for long periods of time as a  - stand for  approx. 10 hrs \par Has RD appt today \par Reenforced education \par \par DM: \par Check BG QD\par Cont with metformin and farxiga \par initiate gabapentin for neuropathy pain to feet \par \par Tachycardia:\par has appt this week for evaluation  \par \par RTC in 8 week [Carbohydrate Consistent Diet] : carbohydrate consistent diet [Long Term Vascular Complications] : long term vascular complications of diabetes [Importance of Diet and Exercise] : importance of diet and exercise to improve glycemic control, achieve weight loss and improve cardiovascular health [Diabetes Foot Care] : diabetes foot care [Self Monitoring of Blood Glucose] : self monitoring of blood glucose

## 2020-01-06 NOTE — REVIEW OF SYSTEMS
[Fatigue] : fatigue [Palpitations] : palpitations [Heart Rate Is Fast] : fast heart rate [Shortness Of Breath] : no shortness of breath [Pain/Numbness of Digits] : pain/numbness of digits [Negative] : Endocrine

## 2020-01-08 ENCOUNTER — TRANSCRIPTION ENCOUNTER (OUTPATIENT)
Age: 30
End: 2020-01-08

## 2020-01-08 LAB
ALBUMIN SERPL ELPH-MCNC: 4.3 G/DL
ALP BLD-CCNC: 103 U/L
ALT SERPL-CCNC: 59 U/L
ANION GAP SERPL CALC-SCNC: 15 MMOL/L
AST SERPL-CCNC: 40 U/L
BASOPHILS # BLD AUTO: 0.09 K/UL
BASOPHILS NFR BLD AUTO: 0.9 %
BILIRUB SERPL-MCNC: 0.2 MG/DL
BUN SERPL-MCNC: 13 MG/DL
C PEPTIDE SERPL-MCNC: 4.7 NG/ML
CALCIUM SERPL-MCNC: 9.4 MG/DL
CHLORIDE SERPL-SCNC: 102 MMOL/L
CHOLEST SERPL-MCNC: 260 MG/DL
CHOLEST/HDLC SERPL: 7.4 RATIO
CO2 SERPL-SCNC: 23 MMOL/L
CREAT SERPL-MCNC: 0.81 MG/DL
CREAT SPEC-SCNC: 265 MG/DL
EOSINOPHIL # BLD AUTO: 0.15 K/UL
EOSINOPHIL NFR BLD AUTO: 1.5 %
ESTIMATED AVERAGE GLUCOSE: 137 MG/DL
GLUCOSE SERPL-MCNC: 101 MG/DL
HBA1C MFR BLD HPLC: 6.4 %
HCT VFR BLD CALC: 47.2 %
HDLC SERPL-MCNC: 35 MG/DL
HGB BLD-MCNC: 14.6 G/DL
IMM GRANULOCYTES NFR BLD AUTO: 0.3 %
LDLC SERPL CALC-MCNC: 156 MG/DL
LYMPHOCYTES # BLD AUTO: 3.72 K/UL
LYMPHOCYTES NFR BLD AUTO: 37.3 %
MAN DIFF?: NORMAL
MCHC RBC-ENTMCNC: 30.9 GM/DL
MCHC RBC-ENTMCNC: 31 PG
MCV RBC AUTO: 100.2 FL
MICROALBUMIN 24H UR DL<=1MG/L-MCNC: 3.5 MG/DL
MICROALBUMIN/CREAT 24H UR-RTO: 13 MG/G
MONOCYTES # BLD AUTO: 0.64 K/UL
MONOCYTES NFR BLD AUTO: 6.4 %
NEUTROPHILS # BLD AUTO: 5.35 K/UL
NEUTROPHILS NFR BLD AUTO: 53.6 %
PLATELET # BLD AUTO: 290 K/UL
POTASSIUM SERPL-SCNC: 4.2 MMOL/L
PROT SERPL-MCNC: 7.4 G/DL
RBC # BLD: 4.71 M/UL
RBC # FLD: 12 %
SODIUM SERPL-SCNC: 140 MMOL/L
TRIGL SERPL-MCNC: 343 MG/DL
WBC # FLD AUTO: 9.98 K/UL

## 2020-02-03 ENCOUNTER — NON-APPOINTMENT (OUTPATIENT)
Age: 30
End: 2020-02-03

## 2020-02-03 ENCOUNTER — APPOINTMENT (OUTPATIENT)
Dept: HEART AND VASCULAR | Facility: CLINIC | Age: 30
End: 2020-02-03
Payer: COMMERCIAL

## 2020-02-03 VITALS
BODY MASS INDEX: 32.05 KG/M2 | HEIGHT: 59 IN | HEART RATE: 118 BPM | WEIGHT: 159 LBS | SYSTOLIC BLOOD PRESSURE: 124 MMHG | DIASTOLIC BLOOD PRESSURE: 80 MMHG

## 2020-02-03 DIAGNOSIS — R00.2 PALPITATIONS: ICD-10-CM

## 2020-02-03 DIAGNOSIS — R00.0 TACHYCARDIA, UNSPECIFIED: ICD-10-CM

## 2020-02-03 PROCEDURE — 93224 XTRNL ECG REC UP TO 48 HRS: CPT

## 2020-02-03 PROCEDURE — 36415 COLL VENOUS BLD VENIPUNCTURE: CPT

## 2020-02-03 PROCEDURE — 99205 OFFICE O/P NEW HI 60 MIN: CPT | Mod: 25

## 2020-02-03 PROCEDURE — 93000 ELECTROCARDIOGRAM COMPLETE: CPT | Mod: LT

## 2020-02-03 RX ORDER — ARIPIPRAZOLE LAUROXIL 1064 MG/3.9ML
INJECTION, SUSPENSION, EXTENDED RELEASE INTRAMUSCULAR
Refills: 0 | Status: ACTIVE | COMMUNITY

## 2020-02-03 RX ORDER — DIVALPROEX SODIUM 500 MG/1
TABLET, DELAYED RELEASE ORAL
Refills: 0 | Status: DISCONTINUED | COMMUNITY
End: 2020-02-03

## 2020-02-03 NOTE — HISTORY OF PRESENT ILLNESS
[FreeTextEntry1] : \par \par 28 y/o female with h/o bipolar, DM, hl, obesity who presents for initial evaluation today for tachycardia/palpitations.\par \par \par No cp, sob, lh, syncope, edema, orthopnea, pnd\par Notes palpitations daily\par concerned she has elevated resting HR based on apple watch and recent MD visits\par \par walks at work but no structured exercise\par recently changed diet due to DM diagnosis\par \par \par \par \par PMH/PSH:\par bipolar\par DM\par obesity\par hl\par anxiety\par \par \par ALL:\par amoxicillin\par nitrofurantoin\par \par \par MEDS:\par aristada monthly\par farxiga 5 mg qd\par neurontin 100 mg qhs\par klonopin .5 mg bid\par metformin 500 mg qd\par trazodone 50 mg qd\par \par \par \par SH:\par quit tobacco 2018, 10 year history 1/2-1ppd\par currently uses ecigs - 1/2 pod daily\par social etoh\par smokes marijuana once every few days for past several years\par \par from [place]\par works as \par \par \par FH:\par mother - breast cancer,  47\par maternal aunt - cva 40's\par father  - alive, hl, 58\par brother - alive, 26, healthy

## 2020-02-03 NOTE — PHYSICAL EXAM
[General Appearance - Well Nourished] : well nourished [General Appearance - Well Developed] : well developed [Normal Conjunctiva] : the conjunctiva exhibited no abnormalities [General Appearance - In No Acute Distress] : no acute distress [Normal Oropharynx] : normal oropharynx [Normal Jugular Venous V Waves Present] : normal jugular venous V waves present [Heart Sounds] : normal S1 and S2 [Heart Rate And Rhythm] : heart rate and rhythm were normal [Arterial Pulses Normal] : the arterial pulses were normal [Edema] : no peripheral edema present [Respiration, Rhythm And Depth] : normal respiratory rhythm and effort [Auscultation Breath Sounds / Voice Sounds] : lungs were clear to auscultation bilaterally [Abdomen Soft] : soft [Bowel Sounds] : normal bowel sounds [Abdomen Tenderness] : non-tender [Abnormal Walk] : normal gait [Nail Clubbing] : no clubbing of the fingernails [Cyanosis, Localized] : no localized cyanosis [Skin Lesions] : no skin lesions [] : no rash [Oriented To Time, Place, And Person] : oriented to person, place, and time [No Anxiety] : not feeling anxious [FreeTextEntry1] : 2/6 arlinem ned

## 2020-02-03 NOTE — DISCUSSION/SUMMARY
[Patient] : the patient [___ Week(s)] : [unfilled] week(s) [FreeTextEntry1] : \par \par 28 y/o female with h/o bipolar, DM, hl, obesity who presents for initial evaluation today.\par \par -ordered ekg today - ST, normal intervals, no st/t changes\par -holter monitor ordered for 24 hours\par -EP referral for possible inappropriate ST\par -ordered echo for tachy/palpitations and murmur\par -ordered tsh today\par -labs 2020 reviewed\par -counseled on diet, exercise, cvd risk factors\par -counseled on e-cigarette cessation (discuss marijuana use)\par -endo for DM management\par -on aristada for bipolar \par -f/up 3 weeks

## 2020-02-06 LAB — TSH SERPL-ACNC: 1.41 UIU/ML

## 2020-03-04 ENCOUNTER — APPOINTMENT (OUTPATIENT)
Dept: HEART AND VASCULAR | Facility: CLINIC | Age: 30
End: 2020-03-04

## 2020-03-05 ENCOUNTER — APPOINTMENT (OUTPATIENT)
Dept: ENDOCRINOLOGY | Facility: CLINIC | Age: 30
End: 2020-03-05

## 2020-03-27 ENCOUNTER — TRANSCRIPTION ENCOUNTER (OUTPATIENT)
Age: 30
End: 2020-03-27

## 2020-03-30 ENCOUNTER — TRANSCRIPTION ENCOUNTER (OUTPATIENT)
Age: 30
End: 2020-03-30

## 2020-07-06 ENCOUNTER — TRANSCRIPTION ENCOUNTER (OUTPATIENT)
Age: 30
End: 2020-07-06

## 2020-07-06 RX ORDER — LANCETS 33 GAUGE
EACH MISCELLANEOUS
Qty: 3 | Refills: 3 | Status: ACTIVE | COMMUNITY
Start: 2019-12-23 | End: 1900-01-01

## 2021-01-06 ENCOUNTER — APPOINTMENT (OUTPATIENT)
Dept: ENDOCRINOLOGY | Facility: CLINIC | Age: 31
End: 2021-01-06

## 2021-01-28 NOTE — ED ADULT NURSE NOTE - CHIEF COMPLAINT QUOTE
2021     Michelle Goldsmith      Chief Complaint   Patient presents with    6 Month Follow-Up     no problems        HPI      Alee Coronel is a 81 y.o. male who presents today with the followin. Impaired fasting glucose    2. Other hyperlipidemia    3. Type 2 diabetes mellitus without complication, without long-term current use of insulin ([address]    4. Essential hypertension    He is here for follow-up of fairly new onset diabetes and other medical problems  He is tolerating Metformin without any significant side effects  He does not have a home blood sugar monitor  He is try to watch his diet    REVIEW OF SYMPTOMS    Review of Systems   Cardiovascular:        Is a history of essential hypertension well-controlled on ACE inhibitor  He has a history of hyperlipidemia taking statin  He has no symptoms of or known heart disease   Genitourinary: Negative.         PAST MEDICAL HISTORY  Past Medical History:   Diagnosis Date    Hypertension     Impaired fasting glucose        FAMILY HISTORY  Family History   Problem Relation Age of Onset    Cancer Mother        SOCIAL HISTORY  Social History     Socioeconomic History    Marital status: Single     Spouse name: None    Number of children: None    Years of education: None    Highest education level: None   Occupational History    None   Social Needs    Financial resource strain: None    Food insecurity     Worry: None     Inability: None    Transportation needs     Medical: None     Non-medical: None   Tobacco Use    Smoking status: Never Smoker    Smokeless tobacco: Never Used   Substance and Sexual Activity    Alcohol use: No    Drug use: No    Sexual activity: None   Lifestyle    Physical activity     Days per week: None     Minutes per session: None    Stress: None   Relationships    Social connections     Talks on phone: None     Gets together: None     Attends Scientologist service: None     Active member of club or organization: None shortness of breath / weakness /chest pain on/ off few days.Pt was seen at  [facility] yesterday for same problem,. pt woke up this morning with  vomiting and diarrhea. Attends meetings of clubs or organizations: None     Relationship status: None    Intimate partner violence     Fear of current or ex partner: None     Emotionally abused: None     Physically abused: None     Forced sexual activity: None   Other Topics Concern    None   Social History Narrative    None        SURGICAL HISTORY  No past surgical history on file. CURRENT MEDICATIONS  Current Outpatient Medications   Medication Sig Dispense Refill    atenolol (TENORMIN) 25 MG tablet Take 1 tablet by mouth 2 times daily 60 tablet 0    hydroCHLOROthiazide (HYDRODIURIL) 25 MG tablet Take 1 tablet by mouth daily 90 tablet 1    atorvastatin (LIPITOR) 10 MG tablet TAKE 1 TABLET BY MOUTH ONE TIME A DAY 90 tablet 0    lisinopril (PRINIVIL;ZESTRIL) 10 MG tablet take 1 tablet by mouth once daily 90 tablet 1    metFORMIN (GLUCOPHAGE-XR) 500 MG extended release tablet Take 1 tablet by mouth daily (with breakfast) 90 tablet 1     No current facility-administered medications for this visit. ALLERGIES  Allergies   Allergen Reactions    Pcn [Penicillins] Other (See Comments)     Unknown childhood allergy       PHYSICAL EXAM    BP (!) 140/80   Pulse 54   Temp 97.2 °F (36.2 °C)   Ht 5' 9\" (1.753 m)   Wt 199 lb 12.8 oz (90.6 kg)   SpO2 96%   BMI 29.51 kg/m²     Physical Exam  Vitals signs and nursing note reviewed. Constitutional:       Appearance: Normal appearance. Cardiovascular:      Rate and Rhythm: Normal rate. Pulmonary:      Effort: Pulmonary effort is normal.   Neurological:      General: No focal deficit present. Psychiatric:         Mood and Affect: Mood normal.     In office A1c 6.4  Reviewed his labs from 6 months ago  Reviewed immunizations    ASSESSMENT and Katey Gonzalez was seen today for 6 month follow-up.     Diagnoses and all orders for this visit:    Impaired fasting glucose    Other hyperlipidemia Type 2 diabetes mellitus without complication, without long-term current use of insulin ([facility] [address]    Essential hypertension    Patient is stable  Would continue same medications  Follow-up in 6 months we will get full labs then  Get Covid vaccine as soon as available    No follow-ups on file. Electronically signed by Frank Soni MD on 1/28/2021    Please note that this chart was generated using dragon dictation software. Although every effort was made to ensure the accuracy of this automated transcription, some errors in transcription may have occurred.

## 2021-03-31 ENCOUNTER — APPOINTMENT (OUTPATIENT)
Dept: ENDOCRINOLOGY | Facility: CLINIC | Age: 31
End: 2021-03-31

## 2021-04-07 ENCOUNTER — APPOINTMENT (OUTPATIENT)
Dept: ENDOCRINOLOGY | Facility: CLINIC | Age: 31
End: 2021-04-07
Payer: MEDICAID

## 2021-04-07 VITALS
DIASTOLIC BLOOD PRESSURE: 86 MMHG | BODY MASS INDEX: 34.07 KG/M2 | HEIGHT: 59 IN | WEIGHT: 169 LBS | SYSTOLIC BLOOD PRESSURE: 132 MMHG | HEART RATE: 105 BPM

## 2021-04-07 DIAGNOSIS — E66.9 OBESITY, UNSPECIFIED: ICD-10-CM

## 2021-04-07 DIAGNOSIS — E11.65 TYPE 2 DIABETES MELLITUS WITH HYPERGLYCEMIA: ICD-10-CM

## 2021-04-07 DIAGNOSIS — E78.5 HYPERLIPIDEMIA, UNSPECIFIED: ICD-10-CM

## 2021-04-07 LAB
GLUCOSE BLDC GLUCOMTR-MCNC: 113
HBA1C MFR BLD HPLC: 6.5

## 2021-04-07 PROCEDURE — 83036 HEMOGLOBIN GLYCOSYLATED A1C: CPT | Mod: QW

## 2021-04-07 PROCEDURE — 99214 OFFICE O/P EST MOD 30 MIN: CPT | Mod: 25

## 2021-04-07 PROCEDURE — 99072 ADDL SUPL MATRL&STAF TM PHE: CPT

## 2021-04-07 PROCEDURE — 82962 GLUCOSE BLOOD TEST: CPT

## 2021-04-07 RX ORDER — BLOOD-GLUCOSE METER
70 EACH MISCELLANEOUS
Qty: 3 | Refills: 3 | Status: ACTIVE | COMMUNITY
Start: 2021-04-07 | End: 1900-01-01

## 2021-04-07 RX ORDER — BLOOD SUGAR DIAGNOSTIC
STRIP MISCELLANEOUS
Qty: 50 | Refills: 11 | Status: ACTIVE | COMMUNITY
Start: 2021-04-07 | End: 1900-01-01

## 2021-04-07 RX ORDER — METFORMIN HYDROCHLORIDE 500 MG/1
500 TABLET, COATED ORAL DAILY
Qty: 90 | Refills: 3 | Status: ACTIVE | COMMUNITY
Start: 1900-01-01 | End: 1900-01-01

## 2021-04-07 RX ORDER — DAPAGLIFLOZIN 10 MG/1
10 TABLET, FILM COATED ORAL
Qty: 90 | Refills: 3 | Status: ACTIVE | COMMUNITY
Start: 1900-01-01 | End: 1900-01-01

## 2021-04-07 NOTE — REVIEW OF SYSTEMS
[Recent Weight Gain (___ Lbs)] : recent weight gain: [unfilled] lbs [Pain/Numbness of Digits] : pain/numbness of digits [Negative] : Endocrine

## 2021-04-07 NOTE — ASSESSMENT
[FreeTextEntry1] : 29 yo female with well controlled DM \par She has gain approximately 20 lbs \par \par DM: \par Check BG daily - new meter One Touch Verio provided \par Cont with Faxiga 10 mg \par Cont. with Metformin \par Referred to Podiatrist and Optho \par Labs today \par \par \par Obesity \par Start a work out routine for at least 30 min a day \par Start Ozempic 0.25 mg - side effects discussed including but not limited to GI disturbances, pancreatitis, and thyroid cancer. \par per patient no self or family h/o thyroid cancer \par RTC in 1 month  [Diabetes Foot Care] : diabetes foot care [Carbohydrate Consistent Diet] : carbohydrate consistent diet [Importance of Diet and Exercise] : importance of diet and exercise to improve glycemic control, achieve weight loss and improve cardiovascular health [Exercise/Effect on Glucose] : exercise/effect on glucose [Retinopathy Screening] : Patient was referred to ophthalmology for retinopathy screening [Other____] : [unfilled]

## 2021-04-07 NOTE — PHYSICAL EXAM
[Alert] : alert [Healthy Appearance] : healthy appearance [Obese] : obese [No Respiratory Distress] : no respiratory distress [Clear to Auscultation] : lungs were clear to auscultation bilaterally [Normal S1, S2] : normal S1 and S2 [No Murmurs] : no murmurs [No Rubs] : no pericardial rub [No Edema] : no peripheral edema [Pedal Pulses Normal] : the pedal pulses are present [No Stigmata of Cushings Syndrome] : no stigmata of Cushings Syndrome [Normal Gait] : normal gait [Foot Ulcers] : no foot ulcers [Left Foot Was Examined] : left foot ~C was examined [Normal] : normal [2+] : 2+ in the dorsalis pedis [Position Sense Dec.] : normal position sense at the level of the toes [No Sensory Deficits] : the sensory exam was normal to light touch and pinprick [Normal Sensation on Monofilament Testing] : normal sensation on monofilament testing of lower extremities [Oriented x3] : oriented to person, place, and time [de-identified] : Tachycardic

## 2021-04-07 NOTE — HISTORY OF PRESENT ILLNESS
[FreeTextEntry1] : Ms. RAGHAV YUN is 29 year old female here today for T2 DM follow-up \par PMH: Bipolar \par Current medications for glucose control: Metformin, faxiga \par \par patient has not been seen since Jan of last year. patient was fearful of COVID and did not want to leave her home.  That being said she has not followed up with optho or podiatry.  She has been fully vaccinated now so she feel better about leaving her home and following up with appointments. She did not [place], so she has not been checking her BG recently. Although she tells me that her sugars have been controlled and she has been eating healthy.  She is still having some pain to her feet so she is not exercising all she does is walk her dog. \par POCT A1C today 6.5%.  \par \par Obesity \par \par She is concerned because she has gained some weight and asked about weight loss surgery.  I discussed with her other option prior that she can trial prior to seeking out surgery. Although this is an option if she would like.  Patient is fearful of surgery and would like to trial other options first. We discussed all GLP-1 option both injection and oral.  She would like to start Ozempic. \par \par \par 1/6/2020\par Today is doing well since last visit/  has made adjustment to diet, but has not been able top start exercise due to pain in feet and elevated HR.  Pt has apple watch which informs her of her HR and resting HR is 120 on average with HR reaching 160 when walking.  She has made an appt. to see cardiologist this week.  She is concerned about the pain in her feet. She tells me its a burning sensation and occasional get swelling to her feet. \par Brought in log and number are close to goal both fasting and post prandial.  She does has some higher sugars in AM.  She would like to keep trying to work on diet and exercise to reach optimal glucose levels. \par \par \par \par \par 12/23/2019\par Pt recently seen in ER for c/o nausea and vomiting upon checking her BG was 250 with  glucose in urine >1000 and trace ketone.\par Pt with polyuria and polydipsia \par New Onset DM \par PMH: bipolar \par \par Last Optho visit: >1 year \par Last Podiatrist visit: never \par Diet:\par Breakfast: bagel, omelets, \par Lunch: sandwiches \par Dinner: Burgers/ pizza \par \par Exercise: sedentary \par Current Medications: Depakote, trazodone, Klonopin, metformin 500 mg QD, faxiga 5 mg QD \par SMBG: 3-4 times a day \par \par \par ROS: +polyuria, polydipsia \par tachycardia - pt tells me that she has had elevated HR since March.  She has been seen in ER with 's.  She feels palpitations but no SOB or CP.  She has never been referred to cardiology for work-up. Unknown cause of tachycardiac

## 2021-04-12 ENCOUNTER — TRANSCRIPTION ENCOUNTER (OUTPATIENT)
Age: 31
End: 2021-04-12

## 2021-04-14 ENCOUNTER — TRANSCRIPTION ENCOUNTER (OUTPATIENT)
Age: 31
End: 2021-04-14

## 2021-04-19 ENCOUNTER — TRANSCRIPTION ENCOUNTER (OUTPATIENT)
Age: 31
End: 2021-04-19

## 2021-04-19 RX ORDER — BLOOD-GLUCOSE METER
W/DEVICE EACH MISCELLANEOUS
Qty: 1 | Refills: 0 | Status: ACTIVE | COMMUNITY
Start: 2021-04-19 | End: 1900-01-01

## 2021-04-19 RX ORDER — BLOOD SUGAR DIAGNOSTIC
STRIP MISCELLANEOUS 3 TIMES DAILY
Qty: 3 | Refills: 3 | Status: ACTIVE | COMMUNITY
Start: 2021-04-19 | End: 1900-01-01

## 2021-04-20 ENCOUNTER — TRANSCRIPTION ENCOUNTER (OUTPATIENT)
Age: 31
End: 2021-04-20

## 2021-06-02 ENCOUNTER — TRANSCRIPTION ENCOUNTER (OUTPATIENT)
Age: 31
End: 2021-06-02

## 2021-06-10 ENCOUNTER — APPOINTMENT (OUTPATIENT)
Dept: INTERNAL MEDICINE | Facility: CLINIC | Age: 31
End: 2021-06-10

## 2022-05-02 RX ORDER — SEMAGLUTIDE 1.34 MG/ML
2 INJECTION, SOLUTION SUBCUTANEOUS
Qty: 1 | Refills: 0 | Status: ACTIVE | COMMUNITY
Start: 2021-04-07 | End: 1900-01-01

## 2022-05-02 RX ORDER — GABAPENTIN 300 MG/1
300 CAPSULE ORAL
Qty: 30 | Refills: 0 | Status: ACTIVE | COMMUNITY
Start: 2020-01-06 | End: 1900-01-01

## 2022-05-02 RX ORDER — LANCETS
EACH MISCELLANEOUS
Qty: 50 | Refills: 11 | Status: ACTIVE | COMMUNITY
Start: 2021-04-07 | End: 1900-01-01

## 2022-05-02 RX ORDER — LANCETS 28 GAUGE
EACH MISCELLANEOUS
Qty: 3 | Refills: 3 | Status: ACTIVE | COMMUNITY
Start: 2021-04-19 | End: 1900-01-01

## 2022-05-16 ENCOUNTER — APPOINTMENT (OUTPATIENT)
Dept: ENDOCRINOLOGY | Facility: CLINIC | Age: 32
End: 2022-05-16

## 2022-08-28 ENCOUNTER — EMERGENCY (EMERGENCY)
Facility: HOSPITAL | Age: 32
LOS: 1 days | Discharge: HOME | End: 2022-08-28
Attending: EMERGENCY MEDICINE | Admitting: EMERGENCY MEDICINE
Payer: COMMERCIAL

## 2022-08-28 VITALS
RESPIRATION RATE: 18 BRPM | SYSTOLIC BLOOD PRESSURE: 97 MMHG | DIASTOLIC BLOOD PRESSURE: 59 MMHG | WEIGHT: 149.91 LBS | HEART RATE: 107 BPM | TEMPERATURE: 99 F | OXYGEN SATURATION: 98 % | HEIGHT: 59 IN

## 2022-08-28 VITALS
RESPIRATION RATE: 18 BRPM | OXYGEN SATURATION: 100 % | HEART RATE: 108 BPM | DIASTOLIC BLOOD PRESSURE: 61 MMHG | SYSTOLIC BLOOD PRESSURE: 104 MMHG | TEMPERATURE: 98 F

## 2022-08-28 DIAGNOSIS — E78.5 HYPERLIPIDEMIA, UNSPECIFIED: ICD-10-CM

## 2022-08-28 DIAGNOSIS — Z88.0 ALLERGY STATUS TO PENICILLIN: ICD-10-CM

## 2022-08-28 DIAGNOSIS — F10.129 ALCOHOL ABUSE WITH INTOXICATION, UNSPECIFIED: ICD-10-CM

## 2022-08-28 DIAGNOSIS — E11.9 TYPE 2 DIABETES MELLITUS WITHOUT COMPLICATIONS: ICD-10-CM

## 2022-08-28 DIAGNOSIS — F31.9 BIPOLAR DISORDER, UNSPECIFIED: ICD-10-CM

## 2022-08-28 DIAGNOSIS — F12.929 CANNABIS USE, UNSPECIFIED WITH INTOXICATION, UNSPECIFIED: ICD-10-CM

## 2022-08-28 DIAGNOSIS — Z88.8 ALLERGY STATUS TO OTHER DRUGS, MEDICAMENTS AND BIOLOGICAL SUBSTANCES STATUS: ICD-10-CM

## 2022-08-28 DIAGNOSIS — R42 DIZZINESS AND GIDDINESS: ICD-10-CM

## 2022-08-28 PROCEDURE — 82962 GLUCOSE BLOOD TEST: CPT

## 2022-08-28 PROCEDURE — 99284 EMERGENCY DEPT VISIT MOD MDM: CPT

## 2022-08-28 PROCEDURE — 99285 EMERGENCY DEPT VISIT HI MDM: CPT

## 2022-08-28 RX ORDER — SODIUM CHLORIDE 9 MG/ML
1000 INJECTION INTRAMUSCULAR; INTRAVENOUS; SUBCUTANEOUS ONCE
Refills: 0 | Status: COMPLETED | OUTPATIENT
Start: 2022-08-28 | End: 2022-08-28

## 2022-08-28 RX ADMIN — SODIUM CHLORIDE 1000 MILLILITER(S): 9 INJECTION INTRAMUSCULAR; INTRAVENOUS; SUBCUTANEOUS at 17:06

## 2022-08-28 RX ADMIN — SODIUM CHLORIDE 1000 MILLILITER(S): 9 INJECTION INTRAMUSCULAR; INTRAVENOUS; SUBCUTANEOUS at 17:57

## 2022-08-28 NOTE — ED PROVIDER NOTE - CLINICAL SUMMARY MEDICAL DECISION MAKING FREE TEXT BOX
here w/ MJ and alcohol intoxication . Patient demonstrates clinical evidence for alcohol intoxication.  Patient admits to intentional heavy drinking  of alcohol and denies fall or injury.  Patient also denies drug use.  Some of the history and physicial exam is limited due to the state of intoxication.  All clothes were removed, all parts of body were evaluated and there is no evidence of acute trauma.  Plan is to observe patient in the ED until clinical sobriety is reached and reassess history and physical examination.

## 2022-08-28 NOTE — ED ADULT TRIAGE NOTE - CHIEF COMPLAINT QUOTE
BIBEMS co lightheadedness, nausea and 3 episodes of emesis s/p smoking marijuana 1 hr PTA. HX T2DM no on any daily meds.  on arrival.

## 2022-08-28 NOTE — ED PROVIDER NOTE - NSICDXPASTMEDICALHX_GEN_ALL_CORE_FT
[Dear  ___] : Dear  [unfilled], [Consult Letter:] : I had the pleasure of evaluating your patient, [unfilled]. [Please see my note below.] : Please see my note below. [Consult Closing:] : Thank you very much for allowing me to participate in the care of this patient.  If you have any questions, please do not hesitate to contact me. [Sincerely,] : Sincerely, [FreeTextEntry2] : Miquel Plummer MD [FreeTextEntry3] : Ayde Blanc MD\par Attending Physician \par Division of Allergy/Immunology \par [facility] \par \par  of Medicine and Pediatrics\par [facility] \par \par [address]\[place] [address]\par Tel: [phone]\par Fax: [phone]\par Email: [email or web address]\par \par \par \par  PAST MEDICAL HISTORY:  Psychiatric problem

## 2022-08-28 NOTE — ED PROVIDER NOTE - OBJECTIVE STATEMENT
Pt with pmx of tachycardia, hyperlipidemia, bipolar, uncontrolled DM presented to ED with c/o of lightheadedness after smoking marijuana and drinking alcohol.  States: "I just want to sleep, leave me alone".   · Presenting Symptoms: lightheadedness  · Negative Findings: no abdominal distension, no abdominal pain, no chills, no fever, no vomiting  · Duration: today  · Context: alcohol related

## 2022-08-28 NOTE — ED ADULT NURSE REASSESSMENT NOTE - NS ED NURSE REASSESS COMMENT FT1
Pt reports still feels lightheaded, refusing to answer further questions from RN, respirations even and unlabored vitals as per flow-sheets.

## 2022-08-28 NOTE — ED PROVIDER NOTE - NSFOLLOWUPINSTRUCTIONS_ED_ALL_ED_FT
EnglishKoreanPortugueseRussianSpanishTagalogVietnamese                                                                                                                                                                                                                                                                                 Substance Use Disorder      Substance use disorder occurs when a person's repeated use of drugs or alcohol interferes with his or her ability to be productive. This disorder can cause problems with mental and physical health. It can affect your ability to have healthy relationships, and it can keep you from being able to meet your responsibilities at work, home, or school. It can also lead to addiction, which is a condition in which the person cannot stop using the substance consistently for a period of time.    Addiction changes the way the brain works. Because of these changes, addiction is a chronic condition. Substance use disorder can be mild, moderate, or severe.    The most commonly abused substances include:  •Alcohol.      •Tobacco.      •Marijuana.      •Stimulants, such as cocaine and methamphetamine.      •Hallucinogens, such as LSD and PCP.      •Opioids, such as some prescription pain medicines and heroin.        What are the causes?    This condition may develop due to many complex social, psychological, or physical reasons, such as:  •Stress.      •Abuse.      •Peer pressure.      •Anxiety or depression.        What increases the risk?    This condition is more likely to develop in people who:  •Use substances to cope with stress.      •Have been abused.      •Have a mental health disorder, such as depression.      •Have a family history of substance use disorder.        What are the signs or symptoms?    Symptoms of this condition include:  •Using the substance for longer periods of time or at a higher dosage than what is normal or intended.      •Having a lasting desire to use the substance.      •Being unable to slow down or stop the use of the substance.      •Spending an abnormal amount of time getting the substance, using the substance, or recovering from using the substance.      •Using the substance in a way that interferes with work, school, social activities, and personal relationships.    •Using the substance even after having negative consequences, such as:  •Health problems.      •Legal or financial troubles.      •Job loss.      •Relationship problems.        •Needing more and more of the substance to get the same effect (developing tolerance).      •Experiencing unpleasant symptoms if you do not use the substance (withdrawal).      •Using the substance to avoid withdrawal symptoms.        How is this diagnosed?    This condition may be diagnosed based on:  •A physical exam.      •Your history of substance use.    •Your symptoms. This includes:  •How substance use affects your life.      •Changes in personality, behaviors, and mood.      •Having at least two symptoms of substance use disorder within a 12-month period.      •Health issues related to substance use, such as liver damage, shortness of breath, fatigue, cough, or heart problems.        •Blood or urine tests to screen for alcohol and drugs.        How is this treated?     This condition may be treated by:  •Stopping substance use safely. This may require taking medicines and being closely monitored for several days.      •Taking part in group and individual counseling from mental health providers who help people with substance use disorder.      •Staying at a live-in (residential) treatment center for several days or weeks.      •Attending daily counseling sessions at a treatment center.    •Taking medicine as told by your health care provider:  •To ease symptoms and prevent complications during withdrawal.      •To treat other mental health issues, such as depression or anxiety.      •To block cravings by causing the same effects as the substance.      •To block the effects of the substance or replace good sensations with unpleasant ones.        •Participating in a support group to share your experience with others who are going through the same thing. These groups are an important part of long-term recovery for many people.      Recovery can be a long process. Many people who undergo treatment start using the substance again after stopping (relapse). If you relapse, that does not mean that treatment will not work.      Follow these instructions at home:     •Take over-the-counter and prescription medicines only as told by your health care provider.      • Do not use any drugs or alcohol.      •Avoid temptations or triggers that you associate with your use of the substance.      •Learn and practice techniques for managing stress.      •Have a plan for vulnerable moments. Get phone numbers of people who are willing to help and who are committed to your recovery.      •Attend support groups on a regular basis. These groups include 12-step programs like Alcoholics Anonymous and Narcotics Anonymous.      •Keep all follow-up visits as told by your health care providers. This is important. This includes continuing to work with therapists and support groups.        Contact a health care provider if:    •You cannot take your medicines as told.      •Your symptoms get worse.      •You have trouble resisting the urge to use drugs or alcohol.        Get help right away if you:    •Relapse.      •Think that you may have taken too much of a drug. The hotline of the National Poison Control [facility] is (493) 294-4741.    •Have signs of an overdose. Symptoms include:  •Chest pain.      •Confusion.      •Sleepiness or difficulty staying awake.      •Slowed breathing.      •Nausea or vomiting.      •A seizure.        •Have serious thoughts about hurting yourself or someone else.      Drug overdose is an emergency. Do not wait to see if the symptoms will go away. Get medical help right away. Call your local emergency services (911 in the [place] Do not drive yourself to the hospital.     If you ever feel like you may hurt yourself or others, or have thoughts about taking your own life, get help right away. You can go to your nearest emergency department or call:   • Your local emergency services (911 in the [place]        • A suicide crisis helpline, such as the National Suicide Prevention Lifeline at 1-131.589.1616. This is open 24 hours a day.         Summary    •Substance use disorder occurs when a person's repeated use of drugs or alcohol interferes with his or her ability to be productive.      •Taking part in group and individual counseling from mental health providers is a common treatment for people with substance use disorder.      •Recovery can be a long process. Many people who undergo treatment start using the substance again after stopping (relapse). A relapse does not mean that treatment will not work.      •Attend support groups such as Alcoholics Anonymous and Narcotics Anonymous. These groups are an important part of long-term recovery for many people.      This information is not intended to replace advice given to you by your health care provider. Make sure you discuss any questions you have with your health care provider.      Document Revised: 04/09/2020 Document Reviewed: 01/29/2019    [employer] Patient Education © 2022 [employer]

## 2022-08-28 NOTE — ED ADULT NURSE NOTE - OBJECTIVE STATEMENT
Pt with pmx of tachycardia, hyperlipidemia, bipolar, uncontrolled DM presented to ED with c/o of lightheadedness after smoking marijuana and drinking alcohol. Pt is uncooperative with slurred speech. States: "I just want to sleep, leave me alone". Patient oriented to ED area. All needs attended. Rounding in progress. Fall risk precautions maintained.

## 2022-08-28 NOTE — ED PROVIDER NOTE - PATIENT PORTAL LINK FT
You can access the FollowMyHealth Patient Portal offered by [facility] by registering at the following website: [email or web address]. By joining [employer]’s FollowMyHealth portal, you will also be able to view your health information using other applications (apps) compatible with our system.

## 2023-03-08 NOTE — ED ADULT NURSE REASSESSMENT NOTE - CAS EDN INTEG ASSESS
Impression: Vitreous degeneration, bilateral: H43.813. Plan: reviewed the signs and symptoms of possible retinal detachment (flashes, floaters, change in peripheral vision, etc). Advised to contact us immediately for any of these or other new symptoms. WDL